# Patient Record
Sex: MALE | Race: BLACK OR AFRICAN AMERICAN | NOT HISPANIC OR LATINO | Employment: FULL TIME | URBAN - METROPOLITAN AREA
[De-identification: names, ages, dates, MRNs, and addresses within clinical notes are randomized per-mention and may not be internally consistent; named-entity substitution may affect disease eponyms.]

---

## 2025-01-09 ENCOUNTER — OFFICE VISIT (OUTPATIENT)
Dept: ORTHOPEDIC SURGERY | Facility: CLINIC | Age: 40
End: 2025-01-09
Payer: COMMERCIAL

## 2025-01-09 VITALS — BODY MASS INDEX: 32.49 KG/M2 | WEIGHT: 195 LBS | HEIGHT: 65 IN

## 2025-01-09 DIAGNOSIS — M21.42 BILATERAL PES PLANUS: ICD-10-CM

## 2025-01-09 DIAGNOSIS — Z00.00 HEALTHCARE MAINTENANCE: ICD-10-CM

## 2025-01-09 DIAGNOSIS — M21.41 BILATERAL PES PLANUS: ICD-10-CM

## 2025-01-09 DIAGNOSIS — M72.2 BILATERAL PLANTAR FASCIITIS: ICD-10-CM

## 2025-01-09 DIAGNOSIS — M25.511 ACUTE PAIN OF RIGHT SHOULDER: Primary | ICD-10-CM

## 2025-01-09 PROCEDURE — 99213 OFFICE O/P EST LOW 20 MIN: CPT

## 2025-01-09 PROCEDURE — 99203 OFFICE O/P NEW LOW 30 MIN: CPT

## 2025-01-09 PROCEDURE — 1036F TOBACCO NON-USER: CPT

## 2025-01-09 PROCEDURE — 3008F BODY MASS INDEX DOCD: CPT

## 2025-01-09 RX ORDER — MELOXICAM 15 MG/1
15 TABLET ORAL DAILY PRN
Qty: 30 TABLET | Refills: 0 | Status: SHIPPED | OUTPATIENT
Start: 2025-01-09 | End: 2025-02-08

## 2025-01-09 RX ORDER — METHYLPREDNISOLONE 4 MG/1
TABLET ORAL
Qty: 21 TABLET | Refills: 0 | Status: SHIPPED | OUTPATIENT
Start: 2025-01-09

## 2025-01-10 NOTE — PROGRESS NOTES
Subjective    Patient ID: Santos Murdock is a 39 y.o. male.    Chief Complaint: Pain of the Left Foot (X1.5-2 MONTHS/NKI)    HPI  This is a pleasant 39-year-old male presenting to the office for evaluation of left foot pain, which has been ongoing for approximately 2 months.  He does occasionally feel this pain in his right foot, but not as much as the left.  There is been no known injury.  He points directly to his plantar fascia when describing the pain that does radiate towards his toes.  Patient states that he has flat-footed bilaterally.  He feels as if it is a burning/tearing sensation.  Pain is exacerbated when running.  He has not tried any specific treatments except a foot wrap, which she states was not of benefit.  He is also tried elevating his feet and stretches.  Patient also does make mention to me that he accidentally slid down a few stairs in his basement yesterday.  He was previously having neck pain with pain radiating down his right upper extremity, but this has slightly worsened since the fall, and now he also has some degree of right shoulder pain.  He does not have any limited range of motion of the right shoulder or weakness.  He is right-hand dominant.  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    The patient's past medical, surgical, family, and social history as well as allergies and medications were reviewed and updated in the chart.    Objective   Ortho Exam  Pleasant in no acute distress.  Patient stands with bilateral pes planus.  There is tenderness today upon palpation of left plantar fascia.  Bilateral feet appearing without soft tissue swelling erythema or ecchymosis.  Skin is intact.  He has full range of motion of left foot and ankle and foot.  Sensation is intact to light touch.    Right upper extremity appearing without soft tissue swelling erythema or ecchymosis.  He has full range of motion of the right shoulder.   He has adequate strength.  He does have slight limited range of motion and increased discomfort of the cervical spine which causes radiculopathy of the right upper extremity with movement of the cervical spine.    Assessment/Plan   Encounter Diagnoses:  Acute pain of right shoulder    Healthcare maintenance    Bilateral pes planus    Bilateral plantar fasciitis    Discussion with patient in regards to complaints of left and right plantar foot pain, and a discussion about plantar fasciitis was done.  I did advise that patient obtain plantar fasciitis insoles for both of his shoes.  I also advised that with any physical activity, specifically running, he should be wearing well supportive running shoes.  We discussed home exercises to perform for plantar fasciitis.  He was also advised that he can freeze a water bottle and use this to roll over his foot in the morning and at night to help release plantar fascia.  If he is continues to see symptoms, a follow-up with a podiatrist would be indicated.    In regards to his acute right shoulder pain, which after speaking with patient and physical exam, seems to be originating more from his cervical spine.  He may have some degree of a pinched nerve in his neck which is causing radiculopathy of the right upper extremity.  This may have been aggravated by recent slipped down the stairs.  I have provided patient a Medrol Dosepak to help decrease pain inflammation.  Patient is aware not to take NSAIDs while on Medrol Dosepak, but can supplement with Tylenol and ice application.  I have also prescribed him meloxicam to begin as needed after completion of Medrol Dosepak.    Patient also inquired about a primary care physician to establish with.  Patient was referred to Martha Lozano NP.    He can follow-up as needed.    Orders Placed This Encounter    Referral to Primary Care    meloxicam (Mobic) 15 mg tablet    methylPREDNISolone (Medrol Dospak) 4 mg tablets

## 2025-01-31 ENCOUNTER — APPOINTMENT (OUTPATIENT)
Dept: PRIMARY CARE | Facility: CLINIC | Age: 40
End: 2025-01-31
Payer: COMMERCIAL

## 2025-01-31 VITALS
HEIGHT: 65 IN | TEMPERATURE: 97.3 F | HEART RATE: 86 BPM | SYSTOLIC BLOOD PRESSURE: 120 MMHG | WEIGHT: 188.4 LBS | DIASTOLIC BLOOD PRESSURE: 78 MMHG | OXYGEN SATURATION: 99 % | BODY MASS INDEX: 31.39 KG/M2

## 2025-01-31 DIAGNOSIS — M25.511 ACUTE PAIN OF RIGHT SHOULDER: ICD-10-CM

## 2025-01-31 DIAGNOSIS — Z13.228 SCREENING FOR ENDOCRINE, METABOLIC AND IMMUNITY DISORDER: ICD-10-CM

## 2025-01-31 DIAGNOSIS — Z13.29 SCREENING FOR ENDOCRINE, METABOLIC AND IMMUNITY DISORDER: ICD-10-CM

## 2025-01-31 DIAGNOSIS — Z13.220 LIPID SCREENING: ICD-10-CM

## 2025-01-31 DIAGNOSIS — Z00.01 ENCOUNTER FOR GENERAL ADULT MEDICAL EXAMINATION WITH ABNORMAL FINDINGS: Primary | ICD-10-CM

## 2025-01-31 DIAGNOSIS — Z13.0 SCREENING FOR ENDOCRINE, METABOLIC AND IMMUNITY DISORDER: ICD-10-CM

## 2025-01-31 DIAGNOSIS — E55.9 VITAMIN D DEFICIENCY: ICD-10-CM

## 2025-01-31 DIAGNOSIS — M54.2 NECK PAIN: ICD-10-CM

## 2025-01-31 PROCEDURE — 99385 PREV VISIT NEW AGE 18-39: CPT | Performed by: INTERNAL MEDICINE

## 2025-01-31 PROCEDURE — 3008F BODY MASS INDEX DOCD: CPT | Performed by: INTERNAL MEDICINE

## 2025-01-31 PROCEDURE — 1036F TOBACCO NON-USER: CPT | Performed by: INTERNAL MEDICINE

## 2025-01-31 RX ORDER — TIZANIDINE 4 MG/1
4 TABLET ORAL EVERY 6 HOURS PRN
Qty: 30 TABLET | Refills: 0 | Status: SHIPPED | OUTPATIENT
Start: 2025-01-31 | End: 2025-02-10

## 2025-01-31 ASSESSMENT — ENCOUNTER SYMPTOMS
SLEEP DISTURBANCE: 0
UNEXPECTED WEIGHT CHANGE: 0
COUGH: 0
OCCASIONAL FEELINGS OF UNSTEADINESS: 0
HEMATURIA: 0
WHEEZING: 0
BLOOD IN STOOL: 0
FEVER: 0
JOINT SWELLING: 0
PALPITATIONS: 0
DIZZINESS: 0
DEPRESSION: 0
EYE PAIN: 0
CONFUSION: 0
FLANK PAIN: 0
CHILLS: 0
NECK PAIN: 1
SEIZURES: 0
APPETITE CHANGE: 0
FREQUENCY: 0
NERVOUS/ANXIOUS: 0
NAUSEA: 0
TREMORS: 0
SORE THROAT: 0
WEAKNESS: 0
CONSTIPATION: 0
NUMBNESS: 0
VOMITING: 0
DYSURIA: 0
BACK PAIN: 0
HEADACHES: 0
EYE DISCHARGE: 0
ABDOMINAL PAIN: 0
LOSS OF SENSATION IN FEET: 0
DIARRHEA: 0
WOUND: 0
TROUBLE SWALLOWING: 0
ARTHRALGIAS: 1
SHORTNESS OF BREATH: 0

## 2025-01-31 ASSESSMENT — PATIENT HEALTH QUESTIONNAIRE - PHQ9
2. FEELING DOWN, DEPRESSED OR HOPELESS: NOT AT ALL
SUM OF ALL RESPONSES TO PHQ9 QUESTIONS 1 AND 2: 0
1. LITTLE INTEREST OR PLEASURE IN DOING THINGS: NOT AT ALL

## 2025-01-31 ASSESSMENT — PAIN SCALES - GENERAL: PAINLEVEL_OUTOF10: 4

## 2025-01-31 NOTE — PROGRESS NOTES
"Subjective   Patient ID: Santos Murdock is a 39 y.o. male who presents for New Patient Visit and Arm Pain (Right arm pain all the way to neck -- fell down stairs 3 weeks ago -- having some numbness and tingling ).    Arm Pain   Pertinent negatives include no chest pain or numbness.      NEW PT ( no recent PCP)  Here to establish.   Chart reviewed, PMHX, meds,  Social HX- updated at visit   Labs( none since 2021) and imaging( none since 2021) reviewed     Recent recent ER visits or hospitalizations:  ER 12/12- vomiting     Specialists:  Ortho    Denies any chronic illnesses ( asthma, heart, kidney , GI, MH conditions)  Does Not take any RX or OTC meds currently.      Review of Systems   Constitutional:  Negative for appetite change, chills, fever and unexpected weight change.   HENT:  Negative for congestion, ear pain, sneezing, sore throat and trouble swallowing.    Eyes:  Negative for pain, discharge and visual disturbance.   Respiratory:  Negative for cough, shortness of breath and wheezing.    Cardiovascular:  Negative for chest pain, palpitations and leg swelling.   Gastrointestinal:  Negative for abdominal pain, blood in stool, constipation, diarrhea, nausea and vomiting.   Genitourinary:  Negative for dysuria, flank pain, frequency, hematuria and urgency.   Musculoskeletal:  Positive for arthralgias (rt shoulder) and neck pain. Negative for back pain, gait problem and joint swelling.   Skin:  Negative for rash and wound.   Neurological:  Negative for dizziness, tremors, seizures, syncope, weakness, numbness and headaches.   Psychiatric/Behavioral:  Negative for confusion, sleep disturbance and suicidal ideas. The patient is not nervous/anxious.        Objective   /78 (BP Location: Right arm, Patient Position: Sitting)   Pulse 86   Temp 36.3 °C (97.3 °F)   Ht 1.651 m (5' 5\")   Wt 85.5 kg (188 lb 6.4 oz)   SpO2 99%   BMI 31.35 kg/m²   Patient Health Questionnaire-2 Score: 0      Physical " Exam  Vitals and nursing note reviewed.   Constitutional:       General: He is not in acute distress.     Appearance: Normal appearance.   HENT:      Head: Normocephalic and atraumatic.      Right Ear: Tympanic membrane and ear canal normal.      Left Ear: Tympanic membrane and ear canal normal.      Nose: Nose normal.      Mouth/Throat:      Mouth: Mucous membranes are moist.      Pharynx: Oropharynx is clear.   Eyes:      Extraocular Movements: Extraocular movements intact.      Conjunctiva/sclera: Conjunctivae normal.      Pupils: Pupils are equal, round, and reactive to light.   Cardiovascular:      Rate and Rhythm: Normal rate and regular rhythm.      Heart sounds: No murmur heard.  Pulmonary:      Effort: Pulmonary effort is normal. No respiratory distress.      Breath sounds: Normal breath sounds. No wheezing or rhonchi.   Abdominal:      General: Abdomen is protuberant. Bowel sounds are normal.      Palpations: Abdomen is soft.      Tenderness: There is no abdominal tenderness.   Musculoskeletal:      Right shoulder: Tenderness present. No swelling or deformity. Decreased range of motion. Normal strength.      Cervical back: Neck supple.      Right lower leg: No edema.      Left lower leg: No edema.   Skin:     General: Skin is warm and dry.      Findings: No bruising or rash.   Neurological:      General: No focal deficit present.      Mental Status: He is alert and oriented to person, place, and time.      Motor: No weakness.      Gait: Gait normal.   Psychiatric:         Mood and Affect: Mood normal.         Behavior: Behavior normal.         Assessment/Plan   Assessment & Plan  Encounter for general adult medical examination with abnormal findings  -Patient in general good health other than below complaints s/p fall 3 weeks ago  - works for [employer]- active with RT arm -would prefer light duty -work excuse given-for 2 weeks light duty        Orders:    Referral to Primary Care    CBC and  Auto Differential; Future    Comprehensive Metabolic Panel; Future    Neck pain  -Acute, s/p fall, took Medrol dose pack from orthopedics, however he stopped taking meloxicam for the past week or so  -I advised him to resume meloxicam and take daily until he finishes the prescription to help with the pain and inflammation  -Add on muscle relaxant as needed  -He may use over-the-counter muscle rubbing creams, and heating pad  -Offered x-ray and declined  -If symptoms are persisting with above suggestions I did recommend  follow-up with orthopedics  Orders:    tiZANidine (Zanaflex) 4 mg tablet; Take 1 tablet (4 mg) by mouth every 6 hours if needed for muscle spasms for up to 10 days.    Acute pain of right shoulder  -See above POC, possible pinched nerve       Lipid screening    Orders:    Lipid Panel; Future    Vitamin D deficiency  -He does not take any MVI OTC  Orders:    Vitamin D 25-Hydroxy,Total (for eval of Vitamin D levels); Future    Screening for endocrine, metabolic and immunity disorder    Orders:    TSH with reflex to Free T4 if abnormal; Future    Hemoglobin A1C; Future

## 2025-01-31 NOTE — PATIENT INSTRUCTIONS
Please take MELOXICAM DAILY until you finish   Please f/u with ORTHOPEDICS if pain does NOT go away

## 2025-04-15 ENCOUNTER — OFFICE VISIT (OUTPATIENT)
Dept: ORTHOPEDIC SURGERY | Facility: CLINIC | Age: 40
End: 2025-04-15
Payer: COMMERCIAL

## 2025-04-15 ENCOUNTER — HOSPITAL ENCOUNTER (OUTPATIENT)
Dept: RADIOLOGY | Facility: CLINIC | Age: 40
Discharge: HOME | End: 2025-04-15
Payer: COMMERCIAL

## 2025-04-15 DIAGNOSIS — M25.511 ACUTE PAIN OF RIGHT SHOULDER: ICD-10-CM

## 2025-04-15 DIAGNOSIS — M72.2 BILATERAL PLANTAR FASCIITIS: ICD-10-CM

## 2025-04-15 DIAGNOSIS — M21.42 BILATERAL PES PLANUS: ICD-10-CM

## 2025-04-15 DIAGNOSIS — M75.81 RIGHT ROTATOR CUFF TENDINITIS: Primary | ICD-10-CM

## 2025-04-15 DIAGNOSIS — M21.41 BILATERAL PES PLANUS: ICD-10-CM

## 2025-04-15 PROCEDURE — 99213 OFFICE O/P EST LOW 20 MIN: CPT

## 2025-04-15 PROCEDURE — 73030 X-RAY EXAM OF SHOULDER: CPT | Mod: RT

## 2025-04-15 PROCEDURE — 73030 X-RAY EXAM OF SHOULDER: CPT | Mod: RIGHT SIDE | Performed by: RADIOLOGY

## 2025-04-15 PROCEDURE — 1036F TOBACCO NON-USER: CPT

## 2025-04-16 NOTE — PROGRESS NOTES
Subjective    Patient ID: Santos Murdokc is a 39 y.o. male.    Chief Complaint: Pain of the Right Shoulder     HPI  This is a pleasant 39-year-old right-hand-dominant male presenting to the office for follow-up in regards to right shoulder pain as well as bilateral feet plantar fasciitis and pes planus.  I saw patient in January, when I advised that he follow-up with a podiatrist in regards to bilateral pes planus and plantar fasciitis.  He has been wearing insoles and arch support with minimal relief of symptoms.  In regards to his right shoulder, when I had previously seen patient symptoms seem to be originating more from his cervical spine with neck pain and numbness and tingling down his right upper extremity.  He states that today he is simply only having right shoulder discomfort and pain and weakness with heavy lifting.  He states that the pain in his neck and the numbness and paresthesia down his right upper extremity has resolved.  States that right shoulder pain is worse with any heavy lifting or overhead reaching activities or repetitive activity.  He has been taking Tylenol and applying IcyHot with minimal relief of symptoms.  He works as a .  There is been no known injury since I last saw patient.    The patient's past medical, surgical, family, and social history as well as allergies and medications were reviewed and updated in the chart.    Objective   Ortho Exam  Pleasant and no acute distress. Right shoulder normal appearance, no overlying skin changes, no muscle atrophy.  Right shoulder forward flexion 160°. No effusion or instability. There is positive Neer and Low impingement. There is subacromial crepitus and tenderness around the subacromial space. No biceps tenderness. No acromioclavicular tenderness. Rotator cuff strength is intact but there is discomfort with strength testing. Left shoulder forward flexion 180°. No effusion or instability. No impingement or crepitus or  tenderness involving the subacromial space. No biceps or acromioclavicular tenderness. There is intact rotator cuff strength. There is adequate range of motion of the cervical spine without pain. Both upper extremities are well perfused, skin is intact and muscle tone is adequate. Elbow flexion and extension and wrist flexion and extension strength are intact. Sensation intact to light touch.    Image results:  Multiple view x-rays of the right shoulder obtained today personally reviewed, without evidence of acute fracture or dislocation.  The glenohumeral and acromioclavicular joint spaces are well-maintained.    Assessment/Plan   Encounter Diagnoses:  Right rotator cuff tendinitis    Acute pain of right shoulder    Bilateral plantar fasciitis    Bilateral pes planus    Plan: Discussion with patient in regards to right shoulder rotator cuff tendinitis with review of right shoulder x-rays.  Conservative treatment options were discussed at length.  Advised that patient participate in a formal physical therapy program to help with right shoulder and upper back strengthening/range of motion, referral provided.  We also did discuss a right shoulder subacromial bursa injection of Kenalog/lidocaine to help decrease pain inflammation, the patient deferred at this time.  He should avoid aggravating activities.  He can take Advil and Tylenol as well as apply topical creams and ice.  If he does not see significant relief of symptoms after 6 to 8 weeks of physical therapy, a cortisone injection could be considered as well as an MRI of the right shoulder to assess integrity of the rotator cuff.  In regards to bilateral pes planus and plantar fasciitis, patient has been referred to podiatry.  He will follow-up as needed.  Orders Placed This Encounter    XR shoulder right 2+ views    Referral to Physical Therapy     No follow-ups on file.

## 2025-04-30 ENCOUNTER — EVALUATION (OUTPATIENT)
Dept: PHYSICAL THERAPY | Facility: CLINIC | Age: 40
End: 2025-04-30
Payer: COMMERCIAL

## 2025-04-30 DIAGNOSIS — M25.511 ACUTE PAIN OF RIGHT SHOULDER: ICD-10-CM

## 2025-04-30 DIAGNOSIS — M75.81 RIGHT ROTATOR CUFF TENDINITIS: ICD-10-CM

## 2025-04-30 PROCEDURE — 97161 PT EVAL LOW COMPLEX 20 MIN: CPT | Mod: GP

## 2025-04-30 ASSESSMENT — COLUMBIA-SUICIDE SEVERITY RATING SCALE - C-SSRS
2. HAVE YOU ACTUALLY HAD ANY THOUGHTS OF KILLING YOURSELF?: NO
6. HAVE YOU EVER DONE ANYTHING, STARTED TO DO ANYTHING, OR PREPARED TO DO ANYTHING TO END YOUR LIFE?: NO
1. IN THE PAST MONTH, HAVE YOU WISHED YOU WERE DEAD OR WISHED YOU COULD GO TO SLEEP AND NOT WAKE UP?: NO

## 2025-04-30 ASSESSMENT — PATIENT HEALTH QUESTIONNAIRE - PHQ9
1. LITTLE INTEREST OR PLEASURE IN DOING THINGS: NOT AT ALL
SUM OF ALL RESPONSES TO PHQ9 QUESTIONS 1 AND 2: 0
2. FEELING DOWN, DEPRESSED OR HOPELESS: NOT AT ALL

## 2025-04-30 NOTE — PROGRESS NOTES
Physical Therapy Evaluation    Patient Name Santos Murdock  MRN: [id number]  Today's Date: 4/30/2025  Time Calculation  Start Time: 1620  Stop Time: 1645  Time Calculation (min): 25 min    Insurance: Payor: [employer] / Plan: [employer] HMP / Product Type: *No Product type* / EVAL $269.00 // 20V PSY 0 USED NO AUTH NEEDED PAYS AT 90% AFTER .00 0 APPLIED OOP 2250.00 20.00 APPLIED**SERVICE YEAR 4/1-3/31**   -authorization required: no    Visit # 1    Problem List Items Addressed This Visit           ICD-10-CM    Acute pain of right shoulder M25.511    Relevant Orders    Follow Up In Physical Therapy    Right rotator cuff tendinitis M75.81    Relevant Orders    Follow Up In Physical Therapy       Onset Date: 1/1/2025  Referring Provider: Gloria Sigala APRN-C*  Next MD appointment: none  PT Orders: eval and treat    Subjective:    Current Episode of Functional Impairment and/or Pain :  Chief complaint/HPI:  Jan 2025 working out at gym and felt like he pulled something in neck  and RUE pain  Now the pain is more in shoulder now  Took meloxicam with some relief (for foot problem)    Pain  Pain assessment 0-10  Pain score: 7  Pain location: R shoulder  Type: achy    Exacerbating Factors: IR, end range flexion, lifting  Relieving Factors: stop what he is doing    Current Medical management:     PMHx: Reviewed medical history form with patient and medical screening assessed.       Medications for pain:     Diagnostic Tests: xrays Normal exam of the shoulder.     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    Objective:    Ortho:  AROM (PROM) B shoulders WNL    Cervical ROM WNL    Strength   shoulder flexion: 4-                 abduction: 3+                 IR: 4-                 ER: 3+  biceps: 5  triceps: 5    Special Tests  impingement sign(Low): +  NEER impingement sign: +  apleys ER: -  apleys IR: +  speeds: +  empty can: -  drop arm: -    Posture: RS/FH    Palpation: POP along scapular spine and anterior R shoulder    Outcome Measures:  Other Measures  Disability of Arm Shoulder Hand (DASH): 32    Treatment:    PT Evaluation Time Entry  PT Evaluation (Low) Time Entry: 25  minutes    Therapeutic Exercise:                               minutes              Response to treatment: improved knowledge and understanding of condition  Santos verbalized understanding and is in agreement with all goals and plan of care.  Santos left session with all questions answered and no increase in symptoms.      Education: Educated on relevant anatomy and expected plan of care  Instructed in initial HEP including reasoning of given exercises and issued written instructions    Assessment:    Impression/Clinical Presentation:  Santos Murdock  is a 39 y.o. old patient who participated in a physical therapy evaluation today due to R shoulder pain.     Santos presents with signs and symptoms consistent with R rotator cuff tendonitis. Santos's impairments include: postural deficits, pain, decreased strength, and decreased functional mobility.       Due to these impairments, he has the following functional limitations and participation restrictions: difficulty performing recreational activities, difficulty performing occupational activities, difficulty with sleeping, and difficulty with completing/performing some ADLs/IADLs.     Skilled PT   Skilled physical therapy services are appropriate and beneficial in order to achieve measurable and meaningful change in the objective tests and measures. Utilization of  skilled physical therapy services will aid in advancing his functional status and attaining his therapy-related goals.     Problem List:  -activity/functional limitations  -Pain R shoulder  -decreased strength   -posture awareness  -decreased knowledge of HEP    Goals:  STG 2 wks  Compliant with HEP  Dec pain 25%    LTG by discharge  I HEP  Improve functional outcome score to indicate improved functional mobility  Dec pain R shoulder % on pain scale with improved sleep  Inc RUE strength 5/5 with improved ability to complete job duties  Inc posture awareness    Rehab Potential:  good    Clinical Presentation:    stable/and/or uncomplicated characteristics                                            Level of Complexity: low    Plan:    Therapeutic exercise, Manual therapy, Home program instruction and progression, Neuromuscular re-education, Therapeutic activities, Self care and home management, Instruction in activity modification, Electrical stimulation, Vasopneumatic device + cold, and Cryotherapy  UBE for soft tissue warmup, posture instruction/exercises, ROM/ strengthening shoulder, scapular strengthening/stabilization, manual and modalities prn      1 x every other week  until goals met or maximum rehab potential met  Pt is currently scheduled for 6 appts    Plan of care was designed with input and agreement by the patient

## 2025-05-19 ENCOUNTER — DOCUMENTATION (OUTPATIENT)
Dept: PHYSICAL THERAPY | Facility: CLINIC | Age: 40
End: 2025-05-19
Payer: COMMERCIAL

## 2025-05-19 NOTE — PROGRESS NOTES
Physical Therapy                 Therapy Communication Note    Patient Name: Santos Murdock  MRN: [id number]  Department:   Room: Room/bed info not found  Today's Date: 5/19/2025     Discipline: Physical Therapy    Missed Visit:       Missed Visit Reason:      Missed Time: Attempt    Comment: pt did not schedule after evaluation. Discharge at this time

## 2026-02-02 ENCOUNTER — APPOINTMENT (OUTPATIENT)
Dept: PRIMARY CARE | Facility: CLINIC | Age: 41
End: 2026-02-02
Payer: COMMERCIAL